# Patient Record
Sex: FEMALE | Race: BLACK OR AFRICAN AMERICAN | Employment: OTHER | URBAN - METROPOLITAN AREA
[De-identification: names, ages, dates, MRNs, and addresses within clinical notes are randomized per-mention and may not be internally consistent; named-entity substitution may affect disease eponyms.]

---

## 2018-03-12 ENCOUNTER — HOSPITAL ENCOUNTER (EMERGENCY)
Age: 73
Discharge: HOME | End: 2018-03-12
Attending: EMERGENCY MEDICINE | Admitting: INTERNAL MEDICINE
Payer: MEDICARE

## 2018-03-12 ENCOUNTER — APPOINTMENT (OUTPATIENT)
Dept: GENERAL RADIOLOGY | Age: 73
End: 2018-03-12
Attending: EMERGENCY MEDICINE
Payer: MEDICARE

## 2018-03-12 VITALS
DIASTOLIC BLOOD PRESSURE: 54 MMHG | RESPIRATION RATE: 24 BRPM | HEIGHT: 65 IN | TEMPERATURE: 97.8 F | OXYGEN SATURATION: 100 % | WEIGHT: 157 LBS | BODY MASS INDEX: 26.16 KG/M2 | SYSTOLIC BLOOD PRESSURE: 133 MMHG | HEART RATE: 84 BPM

## 2018-03-12 DIAGNOSIS — R07.89 OTHER CHEST PAIN: Primary | ICD-10-CM

## 2018-03-12 LAB
ALBUMIN SERPL-MCNC: 3.3 G/DL (ref 3.4–5)
ALBUMIN/GLOB SERPL: 0.9 {RATIO} (ref 0.8–1.7)
ALP SERPL-CCNC: 65 U/L (ref 45–117)
ALT SERPL-CCNC: 34 U/L (ref 13–56)
ANION GAP SERPL CALC-SCNC: 6 MMOL/L (ref 3–18)
AST SERPL-CCNC: 19 U/L (ref 15–37)
BASOPHILS # BLD: 0 K/UL (ref 0–0.06)
BASOPHILS NFR BLD: 1 % (ref 0–2)
BILIRUB SERPL-MCNC: 0.5 MG/DL (ref 0.2–1)
BNP SERPL-MCNC: 33 PG/ML (ref 0–900)
BUN SERPL-MCNC: 14 MG/DL (ref 7–18)
BUN/CREAT SERPL: 15 (ref 12–20)
CALCIUM SERPL-MCNC: 9.6 MG/DL (ref 8.5–10.1)
CHLORIDE SERPL-SCNC: 107 MMOL/L (ref 100–108)
CK MB CFR SERPL CALC: 1.4 % (ref 0–4)
CK MB SERPL-MCNC: 1.3 NG/ML (ref 5–25)
CK SERPL-CCNC: 92 U/L (ref 26–192)
CO2 SERPL-SCNC: 33 MMOL/L (ref 21–32)
CREAT SERPL-MCNC: 0.93 MG/DL (ref 0.6–1.3)
DIFFERENTIAL METHOD BLD: NORMAL
EOSINOPHIL # BLD: 0.1 K/UL (ref 0–0.4)
EOSINOPHIL NFR BLD: 2 % (ref 0–5)
ERYTHROCYTE [DISTWIDTH] IN BLOOD BY AUTOMATED COUNT: 13.2 % (ref 11.6–14.5)
GLOBULIN SER CALC-MCNC: 3.7 G/DL (ref 2–4)
GLUCOSE SERPL-MCNC: 99 MG/DL (ref 74–99)
HCT VFR BLD AUTO: 42.9 % (ref 35–45)
HGB BLD-MCNC: 14.2 G/DL (ref 12–16)
LYMPHOCYTES # BLD: 2 K/UL (ref 0.9–3.6)
LYMPHOCYTES NFR BLD: 35 % (ref 21–52)
MAGNESIUM SERPL-MCNC: 1.9 MG/DL (ref 1.6–2.6)
MCH RBC QN AUTO: 28.5 PG (ref 24–34)
MCHC RBC AUTO-ENTMCNC: 33.1 G/DL (ref 31–37)
MCV RBC AUTO: 86.1 FL (ref 74–97)
MONOCYTES # BLD: 0.3 K/UL (ref 0.05–1.2)
MONOCYTES NFR BLD: 5 % (ref 3–10)
NEUTS SEG # BLD: 3.3 K/UL (ref 1.8–8)
NEUTS SEG NFR BLD: 57 % (ref 40–73)
PLATELET # BLD AUTO: 195 K/UL (ref 135–420)
PMV BLD AUTO: 10.7 FL (ref 9.2–11.8)
POTASSIUM SERPL-SCNC: 4.2 MMOL/L (ref 3.5–5.5)
PROT SERPL-MCNC: 7 G/DL (ref 6.4–8.2)
RBC # BLD AUTO: 4.98 M/UL (ref 4.2–5.3)
SODIUM SERPL-SCNC: 146 MMOL/L (ref 136–145)
TROPONIN I SERPL-MCNC: <0.02 NG/ML (ref 0–0.06)
TROPONIN I SERPL-MCNC: <0.02 NG/ML (ref 0–0.06)
WBC # BLD AUTO: 5.8 K/UL (ref 4.6–13.2)

## 2018-03-12 PROCEDURE — 83735 ASSAY OF MAGNESIUM: CPT | Performed by: EMERGENCY MEDICINE

## 2018-03-12 PROCEDURE — 99285 EMERGENCY DEPT VISIT HI MDM: CPT

## 2018-03-12 PROCEDURE — 85025 COMPLETE CBC W/AUTO DIFF WBC: CPT | Performed by: EMERGENCY MEDICINE

## 2018-03-12 PROCEDURE — 71046 X-RAY EXAM CHEST 2 VIEWS: CPT

## 2018-03-12 PROCEDURE — 84484 ASSAY OF TROPONIN QUANT: CPT | Performed by: EMERGENCY MEDICINE

## 2018-03-12 PROCEDURE — 83880 ASSAY OF NATRIURETIC PEPTIDE: CPT | Performed by: EMERGENCY MEDICINE

## 2018-03-12 PROCEDURE — 80053 COMPREHEN METABOLIC PANEL: CPT | Performed by: EMERGENCY MEDICINE

## 2018-03-12 PROCEDURE — 93005 ELECTROCARDIOGRAM TRACING: CPT

## 2018-03-12 NOTE — DISCHARGE INSTRUCTIONS
Chest Pain: Care Instructions  Your Care Instructions    There are many things that can cause chest pain. Some are not serious and will get better on their own in a few days. But some kinds of chest pain need more testing and treatment. Your doctor may have recommended a follow-up visit in the next 8 to 12 hours. If you are not getting better, you may need more tests or treatment. Even though your doctor has released you, you still need to watch for any problems. The doctor carefully checked you, but sometimes problems can develop later. If you have new symptoms or if your symptoms do not get better, get medical care right away. If you have worse or different chest pain or pressure that lasts more than 5 minutes or you passed out (lost consciousness), call 911 or seek other emergency help right away. A medical visit is only one step in your treatment. Even if you feel better, you still need to do what your doctor recommends, such as going to all suggested follow-up appointments and taking medicines exactly as directed. This will help you recover and help prevent future problems. How can you care for yourself at home? · Rest until you feel better. · Take your medicine exactly as prescribed. Call your doctor if you think you are having a problem with your medicine. · Do not drive after taking a prescription pain medicine. When should you call for help? Call 911 if:  ? · You passed out (lost consciousness). ? · You have severe difficulty breathing. ? · You have symptoms of a heart attack. These may include:  ¨ Chest pain or pressure, or a strange feeling in your chest.  ¨ Sweating. ¨ Shortness of breath. ¨ Nausea or vomiting. ¨ Pain, pressure, or a strange feeling in your back, neck, jaw, or upper belly or in one or both shoulders or arms. ¨ Lightheadedness or sudden weakness. ¨ A fast or irregular heartbeat.   After you call 911, the  may tell you to chew 1 adult-strength or 2 to 4 low-dose aspirin. Wait for an ambulance. Do not try to drive yourself. ?Call your doctor today if:  ? · You have any trouble breathing. ? · Your chest pain gets worse. ? · You are dizzy or lightheaded, or you feel like you may faint. ? · You are not getting better as expected. ? · You are having new or different chest pain. Where can you learn more? Go to http://leanne-larry.info/. Enter A120 in the search box to learn more about \"Chest Pain: Care Instructions. \"  Current as of: March 20, 2017  Content Version: 11.4  © 0568-8114 [employer]. Care instructions adapted under license by [employer] (which disclaims liability or warranty for this information). If you have questions about a medical condition or this instruction, always ask your healthcare professional. [address] any warranty or liability for your use of this information.

## 2018-03-12 NOTE — ED PROVIDER NOTES
EMERGENCY DEPARTMENT HISTORY AND PHYSICAL EXAM    Date: 3/12/2018  Patient Name: Fredrick Manuel    History of Presenting Illness     Chief Complaint   Patient presents with    Chest Pain     History Provided By: Patient    Chief Complaint: Chest pain   Duration: ~3 Days  Timing:  Intermittent  Location: Left chest   Severity: 5 out of 10  Modifying Factors: No relieving or worsening factors   Other Symptoms: jaw/ear pain    Additional History (Context):   11:20 AM   Fredrick Manuel is a 67 y.o. female with PMHX of HTN, SOB, CP, CAD, MI, and cardiac stent placement x3 who presents to the emergency department C/O intermittent 5/10 left chest pain, onset ~3 days ago. Pain usually lasts for a few seconds. Also C/O left jaw/ear pain secondary to chest pain but not simultaneously. There is no known trigger for her sxs. Pt attempted get an appointment with Nicole Pascual MD, cardiologist, who instructed the patient to come to the ED for further evaluation. Pt takes Plavix. Denies fhx of CAD. Pt denies hx of similar sxs, injuries, repetative movements of the left upper extremity, heavy lifting, fever, dizziness, diaphoresis, SOB, cough and any other sxs or complaints. PCP: Alex Mathews MD    Current Outpatient Prescriptions   Medication Sig Dispense Refill    atorvastatin (LIPITOR) 40 mg tablet take 1 tablet by mouth once daily 90 tablet 3    amlodipine (NORVASC) 5 mg tablet take 1 tablet by mouth once daily 90 tablet 3    losartan-hydrochlorothiazide (HYZAAR) 100-12.5 mg per tablet take 1 tablet by mouth once daily 30 Tab 11    Potassium Gluconate 595 (99) mg tablet Take 595 mg by mouth daily.  aspirin 81 mg tablet Take 81 mg by mouth daily.  calcium carbonate (OYSTER SHELL CALCIUM) 500 mg (1,250 mg) tablet Take 1,250 mg by mouth daily.  cholecalciferol, vitamin d3, (VITAMIN D) 1,000 unit tablet Take 1,000 Units by mouth daily.          Past History     Past Medical History:  Past Medical History: Diagnosis Date    Abnormal exercise tolerance test 2/2/2011    CAD (coronary artery disease), native coronary artery 3/3/2011    Chest pain, precordial 2/2/2011    Essential hypertension     Essential hypertension, benign 6/28/2013    Myocardial infarct     Other and unspecified hyperlipidemia 1/27/2014    SOB (shortness of breath) on exertion 2/2/2011       Past Surgical History:  Past Surgical History:   Procedure Laterality Date    HX CORONARY STENT PLACEMENT  2011    4 stents    HX PTCA         Family History:  Family History   Problem Relation Age of Onset    Coronary Artery Disease Brother        Social History:  Social History   Substance Use Topics    Smoking status: Never Smoker    Smokeless tobacco: Never Used    Alcohol use No       Allergies:  No Known Allergies      Review of Systems   Review of Systems   Constitutional: Negative for diaphoresis and fever. HENT: Positive for congestion and ear pain (ear/jaw left). Respiratory: Negative for cough and shortness of breath. Cardiovascular: Positive for chest pain (left). Neurological: Negative for dizziness and light-headedness. All other systems reviewed and are negative. Physical Exam     Vitals:    03/12/18 1330 03/12/18 1400 03/12/18 1430 03/12/18 1501   BP: 118/63 118/73 123/66 133/54   Pulse: 80 78 88 84   Resp: 18 13 26 24   Temp:       SpO2:       Weight:       Height:         Physical Exam   Nursing note and vitals reviewed. Constitutional: Alert. Well appearing, no acute distress  Head: Normocephalic, Atraumatic  Eyes: Pupils are equal, round, and reactive to light, EOMI  ENT: Moist mucous membranes, oropharynx clear. Neck: Supple, non-tender  Cardiovascular: Regular rate and rhythm, no murmurs, rubs, or gallops  Chest: Normal work of breathing and chest excursion bilaterally. One area of focal minimally reproducible tenderness over left upper anterior chest wall in the pectoralis muscle.  This point tenderness is exacerbated with simultaneous abduction of left shoulder. Lungs: Clear to ausculation bilaterally. Abdomen: Soft, non tender, non distended, normoactive bowel sounds  Back: No evidence of trauma or deformity. No CVA Tenderness. Extremities: No evidence of trauma or deformity, no LE edema  Skin: Warm and dry  Neuro: Alert and appropriate, facial movement symmetric, normal speech, strength and sensation full and symmetric bilaterally, normal gait, normal coordination  Psychiatric: Normal mood and affect      Diagnostic Study Results     Labs -  Recent Results (from the past 24 hour(s))   EKG, 12 LEAD, INITIAL    Collection Time: 03/12/18 11:06 AM   Result Value Ref Range    Ventricular Rate 85 BPM    Atrial Rate 85 BPM    P-R Interval 158 ms    QRS Duration 88 ms    Q-T Interval 386 ms    QTC Calculation (Bezet) 459 ms    Calculated P Axis 49 degrees    Calculated R Axis -35 degrees    Calculated T Axis 26 degrees    Diagnosis       Normal sinus rhythm  Left axis deviation  Voltage criteria for left ventricular hypertrophy  Cannot rule out Septal infarct (cited on or before 12-MAR-2018)  Abnormal ECG  When compared with ECG of 07-JUN-2013 18:49,  No significant change was found     CBC WITH AUTOMATED DIFF    Collection Time: 03/12/18 11:19 AM   Result Value Ref Range    WBC 5.8 4.6 - 13.2 K/uL    RBC 4.98 4.20 - 5.30 M/uL    HGB 14.2 12.0 - 16.0 g/dL    HCT 42.9 35.0 - 45.0 %    MCV 86.1 74.0 - 97.0 FL    MCH 28.5 24.0 - 34.0 PG    MCHC 33.1 31.0 - 37.0 g/dL    RDW 13.2 11.6 - 14.5 %    PLATELET 771 326 - 145 K/uL    MPV 10.7 9.2 - 11.8 FL    NEUTROPHILS 57 40 - 73 %    LYMPHOCYTES 35 21 - 52 %    MONOCYTES 5 3 - 10 %    EOSINOPHILS 2 0 - 5 %    BASOPHILS 1 0 - 2 %    ABS. NEUTROPHILS 3.3 1.8 - 8.0 K/UL    ABS. LYMPHOCYTES 2.0 0.9 - 3.6 K/UL    ABS. MONOCYTES 0.3 0.05 - 1.2 K/UL    ABS. EOSINOPHILS 0.1 0.0 - 0.4 K/UL    ABS.  BASOPHILS 0.0 0.0 - 0.06 K/UL    DF AUTOMATED     METABOLIC PANEL, COMPREHENSIVE Collection Time: 03/12/18 11:19 AM   Result Value Ref Range    Sodium 146 (H) 136 - 145 mmol/L    Potassium 4.2 3.5 - 5.5 mmol/L    Chloride 107 100 - 108 mmol/L    CO2 33 (H) 21 - 32 mmol/L    Anion gap 6 3.0 - 18 mmol/L    Glucose 99 74 - 99 mg/dL    BUN 14 7.0 - 18 MG/DL    Creatinine 0.93 0.6 - 1.3 MG/DL    BUN/Creatinine ratio 15 12 - 20      GFR est AA >60 >60 ml/min/1.73m2    GFR est non-AA 59 (L) >60 ml/min/1.73m2    Calcium 9.6 8.5 - 10.1 MG/DL    Bilirubin, total 0.5 0.2 - 1.0 MG/DL    ALT (SGPT) 34 13 - 56 U/L    AST (SGOT) 19 15 - 37 U/L    Alk. phosphatase 65 45 - 117 U/L    Protein, total 7.0 6.4 - 8.2 g/dL    Albumin 3.3 (L) 3.4 - 5.0 g/dL    Globulin 3.7 2.0 - 4.0 g/dL    A-G Ratio 0.9 0.8 - 1.7     CARDIAC PANEL,(CK, CKMB & TROPONIN)    Collection Time: 03/12/18 11:19 AM   Result Value Ref Range    CK 92 26 - 192 U/L    CK - MB 1.3 <3.6 ng/ml    CK-MB Index 1.4 0.0 - 4.0 %    Troponin-I, Qt. <0.02 0.00 - 0.06 NG/ML   MAGNESIUM    Collection Time: 03/12/18 11:19 AM   Result Value Ref Range    Magnesium 1.9 1.6 - 2.6 mg/dL   NT-PRO BNP    Collection Time: 03/12/18 11:19 AM   Result Value Ref Range    NT pro-BNP 33 0 - 900 PG/ML   TROPONIN I    Collection Time: 03/12/18  4:14 PM   Result Value Ref Range    Troponin-I, Qt. <0.02 0.00 - 0.06 NG/ML            Radiologic Studies -   CT Results  (Last 48 hours)    None        CXR Results  (Last 48 hours)               03/12/18 1145  XR CHEST PA LAT Final result    Impression:  IMPRESSION:       No acute cardiopulmonary disease. Narrative:  History: Left-sided chest pain for 3-4 days. Technique: CHEST PA AND LATERAL VIEWS       Comparison study: 06/07/13       Findings:       Streaky linear opacity in the right base suggests atelectasis or interval   scarring. Vascular calcification. There is no evidence of consolidation,   congestive heart failure, pleural effusions or pneumothoraces. No gross bony   abnormalities are seen. Medications given in the ED-  Medications - No data to display      Medical Decision Making   I am the first provider for this patient. I reviewed the vital signs, available nursing notes, past medical history, past surgical history, family history and social history. Vital Signs-Reviewed the patient's vital signs. Pulse Oximetry Analysis - 100% on RA     Cardiac Monitor:  Rate: 85 bpm  Rhythm: NSR    EKG interpretation: (Preliminary)  11:06 AM   NSR at 85 bpm. No ST segment change or T wave inversion. Normal intervals. EKG read by Tommy Smalls MD    Records Reviewed: Nursing Notes    Procedures:  Procedures    ED Course:   11:20 AM Initial assessment performed. The patients presenting problems have been discussed, and they are in agreement with the care plan formulated and outlined with them. I have encouraged them to ask questions as they arise throughout their visit. 1:32 PM Discussed patient's history, exam, and available diagnostics results with Maribell Avelar MD, cardiology, who recommends 4 hour repeat Troponin and follow up with Alex Cassidy MD.     SIGN OUT:  3:00 PM  Patient's presentation, labs/imaging and plan of care was reviewed with Leonetta Mcardle, MD as part of sign out. They will check her second Troponin at 4:30 PM and will discharge and follow up with Dr. Jackie Szymanski as part of the plan discussed with the patient. Leonetta Mcardle, MD's assistance in completion of this plan is greatly appreciated but it should be noted that I will be the provider of record for this patient. riky Nickerson for Tommy Smalls MD.       Diagnosis and Disposition       DISCHARGE NOTE:  5:44 PM  Merlin Kettle Brooks's  results have been reviewed with her. She has been counseled regarding her diagnosis, treatment, and plan. She verbally conveys understanding and agreement of the signs, symptoms, diagnosis, treatment and prognosis and additionally agrees to follow up as discussed.   She also agrees with the care-plan and conveys that all of her questions have been answered. I have also provided discharge instructions for her that include: educational information regarding their diagnosis and treatment, and list of reasons why they would want to return to the ED prior to their follow-up appointment, should her condition change. She has been provided with education for proper emergency department utilization. Discussion for Otto Woods MD: 67 y.o. female presents for evaluation of chest pain that by description sounds very muscoskeletal by etiology and is not concerning for cardiac cause. However she has a significant prior cardiac history that required stents and will acquire a second Troponin to evaluate for any change. Initial labs are reassuring for NAP and EKG is non-ischemic. She will follow up with Dr. Jillian Trujillo as long as second Troponin is normal.     CLINICAL IMPRESSION:    1. Other chest pain        PLAN:  1. D/C Home  2. Current Discharge Medication List        3. Follow-up Information     Follow up With Details Comments [address], [place] Schedule an appointment as soon as possible for a visit in 2 days For follow up with cardiology  [address] [facility] [address]  [address] [facility]      Shena Reyes MD Schedule an appointment as soon as possible for a visit in 2 days  [address]      [facility] EMERGENCY DEPT  As needed, if symptoms worsen [address] [place] [id number]  [phone]        _______________________________    Attestations: This note is prepared by Laura Guadarrama and Dorene Schmidt, acting as Scribe for Otto Woods MD.    Otto Woods MD:  The scribe's documentation has been prepared under my direction and personally reviewed by me in its entirety. I confirm that the note above accurately reflects all work, treatment, procedures, and medical decision making performed by me.     This note is prepared by Gladys Newman, acting as Scribe for Jammie Knutson MD.    Jammie Knutson MD: The scribe's documentation has been prepared under my direction and personally reviewed by me in its entirety.  I confirm that the note above accurately reflects all work, treatment, procedures, and medical decision making performed by me.   _______________________________

## 2018-03-18 LAB
ATRIAL RATE: 85 BPM
CALCULATED P AXIS, ECG09: 49 DEGREES
CALCULATED R AXIS, ECG10: -35 DEGREES
CALCULATED T AXIS, ECG11: 26 DEGREES
DIAGNOSIS, 93000: NORMAL
P-R INTERVAL, ECG05: 158 MS
Q-T INTERVAL, ECG07: 386 MS
QRS DURATION, ECG06: 88 MS
QTC CALCULATION (BEZET), ECG08: 459 MS
VENTRICULAR RATE, ECG03: 85 BPM

## 2018-09-05 ENCOUNTER — HOSPITAL ENCOUNTER (OUTPATIENT)
Dept: LAB | Age: 73
Discharge: HOME | End: 2018-09-05
Payer: MEDICARE

## 2018-09-05 LAB
ALBUMIN SERPL-MCNC: 3.4 G/DL (ref 3.4–5)
ALBUMIN/GLOB SERPL: 1 {RATIO} (ref 0.8–1.7)
ALP SERPL-CCNC: 73 U/L (ref 45–117)
ALT SERPL-CCNC: 32 U/L (ref 13–56)
ANION GAP SERPL CALC-SCNC: 11 MMOL/L (ref 3–18)
AST SERPL-CCNC: 17 U/L (ref 15–37)
BASOPHILS # BLD: 0 K/UL (ref 0–0.1)
BASOPHILS NFR BLD: 1 % (ref 0–2)
BILIRUB SERPL-MCNC: 0.4 MG/DL (ref 0.2–1)
BUN SERPL-MCNC: 17 MG/DL (ref 7–18)
BUN/CREAT SERPL: 17 (ref 12–20)
CALCIUM SERPL-MCNC: 9.4 MG/DL (ref 8.5–10.1)
CHLORIDE SERPL-SCNC: 107 MMOL/L (ref 100–108)
CO2 SERPL-SCNC: 26 MMOL/L (ref 21–32)
CREAT SERPL-MCNC: 1.02 MG/DL (ref 0.6–1.3)
DIFFERENTIAL METHOD BLD: NORMAL
EOSINOPHIL # BLD: 0.1 K/UL (ref 0–0.4)
EOSINOPHIL NFR BLD: 2 % (ref 0–5)
ERYTHROCYTE [DISTWIDTH] IN BLOOD BY AUTOMATED COUNT: 12.9 % (ref 11.6–14.5)
GLOBULIN SER CALC-MCNC: 3.5 G/DL (ref 2–4)
GLUCOSE SERPL-MCNC: 89 MG/DL (ref 74–99)
HCT VFR BLD AUTO: 41.6 % (ref 35–45)
HGB BLD-MCNC: 13.6 G/DL (ref 12–16)
INR PPP: 0.9 (ref 0.8–1.2)
LYMPHOCYTES # BLD: 1.9 K/UL (ref 0.9–3.6)
LYMPHOCYTES NFR BLD: 40 % (ref 21–52)
MCH RBC QN AUTO: 27.8 PG (ref 24–34)
MCHC RBC AUTO-ENTMCNC: 32.7 G/DL (ref 31–37)
MCV RBC AUTO: 85.1 FL (ref 74–97)
MONOCYTES # BLD: 0.2 K/UL (ref 0.05–1.2)
MONOCYTES NFR BLD: 5 % (ref 3–10)
NEUTS SEG # BLD: 2.6 K/UL (ref 1.8–8)
NEUTS SEG NFR BLD: 52 % (ref 40–73)
PLATELET # BLD AUTO: 214 K/UL (ref 135–420)
PMV BLD AUTO: 10.8 FL (ref 9.2–11.8)
POTASSIUM SERPL-SCNC: 3.6 MMOL/L (ref 3.5–5.5)
PROT SERPL-MCNC: 6.9 G/DL (ref 6.4–8.2)
PROTHROMBIN TIME: 12.3 SEC (ref 11.5–15.2)
RBC # BLD AUTO: 4.89 M/UL (ref 4.2–5.3)
SODIUM SERPL-SCNC: 144 MMOL/L (ref 136–145)
WBC # BLD AUTO: 4.8 K/UL (ref 4.6–13.2)

## 2018-09-05 PROCEDURE — 85025 COMPLETE CBC W/AUTO DIFF WBC: CPT | Performed by: INTERNAL MEDICINE

## 2018-09-05 PROCEDURE — 85610 PROTHROMBIN TIME: CPT | Performed by: INTERNAL MEDICINE

## 2018-09-05 PROCEDURE — 80053 COMPREHEN METABOLIC PANEL: CPT | Performed by: INTERNAL MEDICINE

## 2018-09-05 PROCEDURE — 36415 COLL VENOUS BLD VENIPUNCTURE: CPT | Performed by: INTERNAL MEDICINE

## 2018-09-18 ENCOUNTER — HOSPITAL ENCOUNTER (OUTPATIENT)
Age: 73
Setting detail: OUTPATIENT SURGERY
Discharge: HOME | End: 2018-09-18
Attending: INTERNAL MEDICINE | Admitting: INTERNAL MEDICINE
Payer: MEDICARE

## 2018-09-18 VITALS
HEIGHT: 66 IN | RESPIRATION RATE: 20 BRPM | BODY MASS INDEX: 27.48 KG/M2 | SYSTOLIC BLOOD PRESSURE: 122 MMHG | WEIGHT: 171 LBS | TEMPERATURE: 98 F | OXYGEN SATURATION: 96 % | HEART RATE: 78 BPM | DIASTOLIC BLOOD PRESSURE: 67 MMHG

## 2018-09-18 DIAGNOSIS — I25.10 CORONARY ARTERY DISEASE INVOLVING NATIVE HEART WITHOUT ANGINA PECTORIS, UNSPECIFIED VESSEL OR LESION TYPE: ICD-10-CM

## 2018-09-18 DIAGNOSIS — R94.39 ABNORMAL STRESS TEST: ICD-10-CM

## 2018-09-18 PROCEDURE — 74011250636 HC RX REV CODE- 250/636

## 2018-09-18 PROCEDURE — 99152 MOD SED SAME PHYS/QHP 5/>YRS: CPT | Performed by: INTERNAL MEDICINE

## 2018-09-18 PROCEDURE — 74011000250 HC RX REV CODE- 250: Performed by: INTERNAL MEDICINE

## 2018-09-18 PROCEDURE — C1769 GUIDE WIRE: HCPCS | Performed by: INTERNAL MEDICINE

## 2018-09-18 PROCEDURE — 74011636320 HC RX REV CODE- 636/320: Performed by: INTERNAL MEDICINE

## 2018-09-18 PROCEDURE — 77030029997 HC DEV COM RDL R BND TELE -B: Performed by: INTERNAL MEDICINE

## 2018-09-18 PROCEDURE — 77030004522 HC CATH ANGI DX EXPO BSC -A: Performed by: INTERNAL MEDICINE

## 2018-09-18 PROCEDURE — C1894 INTRO/SHEATH, NON-LASER: HCPCS | Performed by: INTERNAL MEDICINE

## 2018-09-18 PROCEDURE — 77030008543 HC TBNG MON PRSS MRTM -A: Performed by: INTERNAL MEDICINE

## 2018-09-18 PROCEDURE — 93458 L HRT ARTERY/VENTRICLE ANGIO: CPT | Performed by: INTERNAL MEDICINE

## 2018-09-18 PROCEDURE — 77030013797 HC KT TRNSDUC PRSSR EDWD -A: Performed by: INTERNAL MEDICINE

## 2018-09-18 PROCEDURE — 99153 MOD SED SAME PHYS/QHP EA: CPT | Performed by: INTERNAL MEDICINE

## 2018-09-18 PROCEDURE — 74011250636 HC RX REV CODE- 250/636: Performed by: INTERNAL MEDICINE

## 2018-09-18 RX ORDER — SODIUM CHLORIDE 9 MG/ML
100 INJECTION, SOLUTION INTRAVENOUS CONTINUOUS
Status: DISCONTINUED | OUTPATIENT
Start: 2018-09-18 | End: 2018-09-18 | Stop reason: HOSPADM

## 2018-09-18 RX ORDER — SODIUM CHLORIDE 9 MG/ML
75 INJECTION, SOLUTION INTRAVENOUS CONTINUOUS
Status: DISCONTINUED | OUTPATIENT
Start: 2018-09-18 | End: 2018-09-18 | Stop reason: HOSPADM

## 2018-09-18 RX ORDER — VERAPAMIL HYDROCHLORIDE 2.5 MG/ML
INJECTION, SOLUTION INTRAVENOUS AS NEEDED
Status: DISCONTINUED | OUTPATIENT
Start: 2018-09-18 | End: 2018-09-18 | Stop reason: HOSPADM

## 2018-09-18 RX ORDER — HEPARIN SODIUM 1000 [USP'U]/ML
INJECTION, SOLUTION INTRAVENOUS; SUBCUTANEOUS AS NEEDED
Status: DISCONTINUED | OUTPATIENT
Start: 2018-09-18 | End: 2018-09-18 | Stop reason: HOSPADM

## 2018-09-18 RX ORDER — LIDOCAINE HYDROCHLORIDE 10 MG/ML
INJECTION INFILTRATION; PERINEURAL AS NEEDED
Status: DISCONTINUED | OUTPATIENT
Start: 2018-09-18 | End: 2018-09-18 | Stop reason: HOSPADM

## 2018-09-18 RX ORDER — FENTANYL CITRATE 50 UG/ML
INJECTION, SOLUTION INTRAMUSCULAR; INTRAVENOUS AS NEEDED
Status: DISCONTINUED | OUTPATIENT
Start: 2018-09-18 | End: 2018-09-18 | Stop reason: HOSPADM

## 2018-09-18 RX ORDER — SODIUM CHLORIDE 0.9 % (FLUSH) 0.9 %
5-10 SYRINGE (ML) INJECTION EVERY 8 HOURS
Status: DISCONTINUED | OUTPATIENT
Start: 2018-09-18 | End: 2018-09-18 | Stop reason: HOSPADM

## 2018-09-18 RX ORDER — SODIUM CHLORIDE 0.9 % (FLUSH) 0.9 %
5-10 SYRINGE (ML) INJECTION AS NEEDED
Status: DISCONTINUED | OUTPATIENT
Start: 2018-09-18 | End: 2018-09-18 | Stop reason: HOSPADM

## 2018-09-18 RX ORDER — MIDAZOLAM HYDROCHLORIDE 1 MG/ML
INJECTION, SOLUTION INTRAMUSCULAR; INTRAVENOUS AS NEEDED
Status: DISCONTINUED | OUTPATIENT
Start: 2018-09-18 | End: 2018-09-18 | Stop reason: HOSPADM

## 2018-09-18 RX ADMIN — SODIUM CHLORIDE 75 ML/HR: 900 INJECTION, SOLUTION INTRAVENOUS at 08:03

## 2018-09-18 NOTE — Clinical Note
TRANSFER - IN REPORT:  
 
Verbal report received from: [address] . Report consisted of patient's Situation, Background, Assessment and  
Recommendations(SBAR). Opportunity for questions and clarification was provided. Assessment completed upon patient's arrival to unit and care assumed. Patient transported with a Cardiac Cath Tech / Patient Care Tech.

## 2018-09-18 NOTE — Clinical Note
TRANSFER - OUT REPORT:  
 
Verbal report given to: Jami Paul. Report consisted of patient's Situation, Background, Assessment and  
Recommendations(SBAR). Opportunity for questions and clarification was provided. Patient transported with a Cardiac Cath Tech / Patient Care Tech. Patient transported to: CARE.

## 2018-09-18 NOTE — DISCHARGE INSTRUCTIONS
Cardiac Catheterization/Angiography Discharge Instructions    *Check the puncture site frequently for swelling or bleeding. If you see any bleeding, lie down and apply pressure over the area with a clean town or washcloth. Notify your doctor for any redness, swelling, drainage or oozing from the puncture site. Notify your doctor for any fever or chills. *If the leg or arm with the puncture becomes cold, numb or painful, call Dr Meagan Mancia     *Activity should be limited for the next 48 hours. Climb stairs as little as possible and avoid any stooping, bending or strenuous activity for 48 hours. No heavy lifting (anything over 10 pounds) for three days. *Do not drive for 48 hours. *You may resume your usual diet. Drink more fluids than usual.    *Have a responsible person drive you home and stay with you for at least 24 hours after your heart catheterization/angiography. *You may remove the bandage from your Left and Arm in 24 hours. You may shower in 24 hours. No tub baths, hot tubs or swimming for one week. Do not place any lotions, creams, powders, ointments over the puncture site for one week. You may place a clean band-aid over the puncture site each day for 5 days. Change this daily. DISCHARGE SUMMARY from Nurse    PATIENT INSTRUCTIONS:    After general anesthesia or intravenous sedation, for 24 hours or while taking prescription Narcotics:  · Limit your activities  · Do not drive and operate hazardous machinery  · Do not make important personal or business decisions  · Do  not drink alcoholic beverages  · If you have not urinated within 8 hours after discharge, please contact your surgeon on call.     Report the following to your surgeon:  · Excessive pain, swelling, redness or odor of or around the surgical area  · Temperature over 100.5  · Nausea and vomiting lasting longer than 4 hours or if unable to take medications  · Any signs of decreased circulation or nerve impairment to extremity: change in color, persistent  numbness, tingling, coldness or increase pain  · Any questions    What to do at Home:  Recommended activity: Activity as tolerated,       *  Please give a list of your current medications to your Primary Care Provider. *  Please update this list whenever your medications are discontinued, doses are      changed, or new medications (including over-the-counter products) are added. *  Please carry medication information at all times in case of emergency situations. These are general instructions for a healthy lifestyle:    No smoking/ No tobacco products/ Avoid exposure to second hand smoke  Surgeon General's Warning:  Quitting smoking now greatly reduces serious risk to your health. Obesity, smoking, and sedentary lifestyle greatly increases your risk for illness    A healthy diet, regular physical exercise & weight monitoring are important for maintaining a healthy lifestyle    You may be retaining fluid if you have a history of heart failure or if you experience any of the following symptoms:  Weight gain of 3 pounds or more overnight or 5 pounds in a week, increased swelling in our hands or feet or shortness of breath while lying flat in bed. Please call your doctor as soon as you notice any of these symptoms; do not wait until your next office visit. Recognize signs and symptoms of STROKE:    F-face looks uneven    A-arms unable to move or move unevenly    S-speech slurred or non-existent    T-time-call 911 as soon as signs and symptoms begin-DO NOT go       Back to bed or wait to see if you get better-TIME IS BRAIN. Warning Signs of HEART ATTACK     Call 911 if you have these symptoms:   Chest discomfort. Most heart attacks involve discomfort in the center of the chest that lasts more than a few minutes, or that goes away and comes back. It can feel like uncomfortable pressure, squeezing, fullness, or pain.    Discomfort in other areas of the upper body. Symptoms can include pain or discomfort in one or both arms, the back, neck, jaw, or stomach.  Shortness of breath with or without chest discomfort.  Other signs may include breaking out in a cold sweat, nausea, or lightheadedness. Don't wait more than five minutes to call 911 - MINUTES MATTER! Fast action can save your life. Calling 911 is almost always the fastest way to get lifesaving treatment. Emergency Medical Services staff can begin treatment when they arrive -- up to an hour sooner than if someone gets to the hospital by car. The discharge information has been reviewed with the patient and son. The patient and caregiver verbalized understanding. Discharge medications reviewed with the patient and caregiver and appropriate educational materials and side effects teaching were provided.       Patient armband removed and shredded    ___________________________________________________________________________________________________________________________________

## 2018-09-18 NOTE — PROGRESS NOTES
1000 Pt back from to the care unit S/P cardiac cath. Pt awake and alert. Lt wrist accessed, TR band in place and immobilizer in place. Site wnl. Offered no c/o pain. 1145 TR band removed and tolerated well. site wnl 
 
1300 Pt discharged home in the care of son, recovery uneventful. All discharge instructions reviewed and understandings verbalized well. Pt escorted to car via w/c and left with son in stable condition.

## 2018-09-18 NOTE — PROCEDURES
LHC and Coronary angiogram done via left radial approach. Coronary anatomy described below with noted coronary artery disease. Left main has luminal irregularities. LAD has luminal irregularities. Mid and distal LAD stent is patent. LCx is non dominant vessel. Mid LCx has 50% stenosis. OM1 is 100% occluded in proximal portion. OM1 seen by left to left and right to left collaterals. RCA is dominant vessel. Proximal RCA has 50% stenosis, Mid to distal RCA has 50-60% stenosis. Right to left collaterals seen. LV gram was not done. LVEDP 6 mm hg. No significant gradient on pull back. Patient tolerated procedure well. Scant blood loss. No complications. No specimen removed. Recommendation:    Medication considered:   Aspirin, beta blocker, ACEI, Nitrates and statin   CAD risk factor education  Diet education  Control cholesterol  Blood pressure control  Exercise education: Age and functional status appropriate.

## 2018-09-18 NOTE — DISCHARGE SUMMARY
Discharge Summary    Patient: Randal Jhaveri MRN: [id number]  CSN: [id number]    YOB: 1945  Age: 68 y.o. Sex: female    DOA: 9/18/2018 LOS:  LOS: 0 days   Discharge Date:      Primary Care Provider:  Sathish Wood MD    Admission Diagnoses: Coronary artery disease involving native heart with angina pectoris, unspecified vessel or lesion type   Abnormal stress test [R94.39]    Discharge Diagnoses:  CAD with angina   Problem List as of 9/18/2018  Date Reviewed: 7/29/2014          Codes Class Noted - Resolved    Other and unspecified hyperlipidemia ICD-10-CM: E78.5  ICD-9-CM: 272.4  1/27/2014 - Present        Essential hypertension, benign ICD-10-CM: I10  ICD-9-CM: 401.1  6/28/2013 - Present        CAD (coronary artery disease), native coronary artery ICD-10-CM: I25.10  ICD-9-CM: 414.01  3/3/2011 - Present        SOB (shortness of breath) on exertion ICD-10-CM: R06.02  ICD-9-CM: 786.05  2/2/2011 - Present        Chest pain, precordial ICD-10-CM: R07.2  ICD-9-CM: 786.51  2/2/2011 - Present        Abnormal exercise tolerance test ICD-10-CM: R94.30  ICD-9-CM: 794.30  2/2/2011 - Present              Discharge Medications:     Current Discharge Medication List      CONTINUE these medications which have NOT CHANGED    Details   atorvastatin (LIPITOR) 40 mg tablet take 1 tablet by mouth once daily  Qty: 90 tablet, Refills: 3      amlodipine (NORVASC) 5 mg tablet take 1 tablet by mouth once daily  Qty: 90 tablet, Refills: 3      losartan-hydrochlorothiazide (HYZAAR) 100-12.5 mg per tablet take 1 tablet by mouth once daily  Qty: 30 Tab, Refills: 11      Potassium Gluconate 595 (99) mg tablet Take 595 mg by mouth daily. aspirin 81 mg tablet Take 81 mg by mouth daily. calcium carbonate (OYSTER SHELL CALCIUM) 500 mg (1,250 mg) tablet Take 1,250 mg by mouth daily. cholecalciferol, vitamin d3, (VITAMIN D) 1,000 unit tablet Take 1,000 Units by mouth daily.              Discharge Condition: Stable Procedures : LHC and coronary angiogram     Consults: None       PHYSICAL EXAM   Visit Vitals    /68 (BP 1 Location: Right arm, BP Patient Position: At rest;Supine)    Pulse 72    Temp 97.7 °F (36.5 °C)    Resp 20    Ht 5' 6\" (1.676 m)    Wt 77.6 kg (171 lb)    SpO2 96%    Breastfeeding No    BMI 27.6 kg/m2     General: Awake, cooperative, no acute distress    HEENT: NC, Atraumatic. PERRLA, EOMI. Anicteric sclerae. Lungs:  CTA Bilaterally. No Wheezing/Rhonchi/Rales. Heart:  Regular  rhythm,  No murmur, No Rubs, No Gallops  Abdomen: Soft, Non distended, Non tender. +Bowel sounds,   Extremities: No c/c/e  Psych:   Not anxious or agitated. Neurologic:  No acute neurological deficits. Admission HPI : See H/P    Hospital Course : Patient came for out patient LHC and coronary angiogram. LHC and Coronary angiogram done via left radial approach. Coronary anatomy described below with noted coronary artery disease. Left main has luminal irregularities. LAD has luminal irregularities. Mid and distal LAD stent is patent. LCx is non dominant vessel. Mid LCx has 50% stenosis. OM1 is 100% occluded in proximal portion. OM1 seen by left to left and right to left collaterals. RCA is dominant vessel. Proximal RCA has 50% stenosis, Mid to distal RCA has 50-60% stenosis. Right to left collaterals seen. LV gram was not done. LVEDP 6 mm hg. No significant gradient on pull back. Patient tolerated procedure well. Scant blood loss. No complications. No specimen removed. Recommendation:    Medication considered:   Aspirin, beta blocker, ACEI, Nitrates and statin   CAD risk factor education  Diet education  Control cholesterol  Blood pressure control  Exercise education: Age and functional status appropriate. Activity: No weight lifting not more than 10 lbs from left hand for 7 days, No driving for 24 hours     Diet: Cardiac     Follow-up:  In cardiology clinic after 2 weeks    Disposition: Home    Minutes spent on discharge: 30 minutes       Labs: Results:       Chemistry No results for input(s): GLU, NA, K, CL, CO2, BUN, CREA, CA, AGAP, BUCR, TBIL, GPT, AP, TP, ALB, GLOB, AGRAT in the last 72 hours. CBC w/Diff No results for input(s): WBC, RBC, HGB, HCT, PLT, GRANS, LYMPH, EOS, HGBEXT, HCTEXT, PLTEXT in the last 72 hours. Cardiac Enzymes No results for input(s): CPK, CKND1, MIRIAN in the last 72 hours. No lab exists for component: CKRMB, TROIP   Coagulation No results for input(s): PTP, INR, APTT in the last 72 hours. No lab exists for component: INREXT    Lipid Panel Lab Results   Component Value Date/Time    Cholesterol, total 154 07/29/2014 09:20 AM    HDL Cholesterol 37 (L) 07/29/2014 09:20 AM    LDL, calculated 64 07/29/2014 09:20 AM    VLDL, calculated 53 (H) 07/29/2014 09:20 AM    Triglyceride 264 (H) 07/29/2014 09:20 AM    CHOL/HDL Ratio 4.9 02/09/2011 04:10 AM      BNP No results for input(s): BNPP in the last 72 hours. Liver Enzymes No results for input(s): TP, ALB, TBIL, AP, SGOT, GPT in the last 72 hours. No lab exists for component: DBIL   Thyroid Studies No results found for: T4, T3U, TSH, TSHEXT         Significant Diagnostic Studies: No results found. No results found for this or any previous visit.         CC: Torrey Young MD

## 2018-09-18 NOTE — IP AVS SNAPSHOT
[facility] 
 
 
 [address] [address] 
[phone] Patient: Magdaleno Sharif MRN: [id number] :1945 About your hospitalization You were admitted on:  2018 You last received care in the:  [address] You were discharged on:  2018 Why you were hospitalized Your primary diagnosis was:  Not on File Follow-up Information Follow up With Details Comments Contact Info Paxton Polanco MD In 2 weeks Follow up as scheduled [address] [facility] [address] [facility] 
[phone] Jojo Angeles MD   [address] [place] [address] [address] [address] 
[phone] Discharge Orders None A check adi indicates which time of day the medication should be taken. My Medications CONTINUE taking these medications Instructions Each Dose to Equal  
 Morning Noon Evening Bedtime  
 amLODIPine 5 mg tablet Commonly known as:  Kuldeep Benitez Your last dose was: Your next dose is:    
   
   
 take 1 tablet by mouth once daily  
     
   
   
   
  
 aspirin 81 mg tablet Your last dose was: Your next dose is: Take 81 mg by mouth daily. 81 mg  
    
   
   
   
  
 atorvastatin 40 mg tablet Commonly known as:  LIPITOR Your last dose was: Your next dose is:    
   
   
 take 1 tablet by mouth once daily  
     
   
   
   
  
 losartan-hydroCHLOROthiazide 100-12.5 mg per tablet Commonly known as:  HYZAAR Your last dose was: Your next dose is:    
   
   
 take 1 tablet by mouth once daily OYSTER SHELL CALCIUM 500 mg calcium (1,250 mg) tablet Generic drug:  calcium carbonate Your last dose was: Your next dose is: Take 1,250 mg by mouth daily. 1250 mg  
    
   
   
   
  
 Potassium Gluconate 595 mg (99 mg) tablet Your last dose was: Your next dose is: Take 595 mg by mouth daily. 595 mg  
    
   
   
   
  
 VITAMIN D3 1,000 unit tablet Generic drug:  cholecalciferol Your last dose was: Your next dose is: Take 1,000 Units by mouth daily. 1000 Units Discharge Instructions Cardiac Catheterization/Angiography Discharge Instructions *Check the puncture site frequently for swelling or bleeding. If you see any bleeding, lie down and apply pressure over the area with a clean town or washcloth. Notify your doctor for any redness, swelling, drainage or oozing from the puncture site. Notify your doctor for any fever or chills. *If the leg or arm with the puncture becomes cold, numb or painful, call Dr Laury Cisneros *Activity should be limited for the next 48 hours. Climb stairs as little as possible and avoid any stooping, bending or strenuous activity for 48 hours. No heavy lifting (anything over 10 pounds) for three days. *Do not drive for 48 hours. *You may resume your usual diet. Drink more fluids than usual. 
 
*Have a responsible person drive you home and stay with you for at least 24 hours after your heart catheterization/angiography. *You may remove the bandage from your Left and Arm in 24 hours. You may shower in 24 hours. No tub baths, hot tubs or swimming for one week. Do not place any lotions, creams, powders, ointments over the puncture site for one week. You may place a clean band-aid over the puncture site each day for 5 days. Change this daily. DISCHARGE SUMMARY from Nurse PATIENT INSTRUCTIONS: 
 
 
F-face looks uneven A-arms unable to move or move unevenly S-speech slurred or non-existent T-time-call 911 as soon as signs and symptoms begin-DO NOT go  
 Back to bed or wait to see if you get better-TIME IS BRAIN. Warning Signs of HEART ATTACK Call 911 if you have these symptoms: 
? Chest discomfort. Most heart attacks involve discomfort in the center of the chest that lasts more than a few minutes, or that goes away and comes back. It can feel like uncomfortable pressure, squeezing, fullness, or pain. ? Discomfort in other areas of the upper body. Symptoms can include pain or discomfort in one or both arms, the back, neck, jaw, or stomach. ? Shortness of breath with or without chest discomfort. ? Other signs may include breaking out in a cold sweat, nausea, or lightheadedness. Don't wait more than five minutes to call [address]! Fast action can save your life. Calling 911 is almost always the fastest way to get lifesaving treatment. Emergency Medical Services staff can begin treatment when they arrive  up to an hour sooner than if someone gets to the hospital by car. The discharge information has been reviewed with the patient and son. The patient and caregiver verbalized understanding. Discharge medications reviewed with the patient and caregiver and appropriate educational materials and side effects teaching were provided. Patient armband removed and shredded 
 
___________________________________________________________________________________________________________________________________ tripJanehart Announcement We are excited to announce that we are making your provider's discharge notes available to you in tripJanehart. You will see these notes when they are completed and signed by the physician that discharged you from your recent hospital stay. If you have any questions or concerns about any information you see in tripJanehart, please call the Health Information Department where you were seen or reach out to your Primary Care Provider for more information about your plan of care. Introducing [place]! [employer] introduces [employer] patient portal. Now you can access parts of your medical record, email your doctor's office, and request medication refills online. 1. In your internet browser, go to [email or web address] 2. Click on the First Time User? Click Here link in the Sign In box. You will see the New Member Sign Up page. 3. Enter your [employer] Access Code exactly as it appears below. You will not need to use this code after youve completed the sign-up process. If you do not sign up before the expiration date, you must request a new code. · [employer] Access Code: [email or web address] Expires: 12/4/2018  2:40 PM 
 
4. Enter the last four digits of your Social Security Number (xxxx) and Date of Birth (mm/dd/yyyy) as indicated and click Submit. You will be taken to the next sign-up page. 5. Create a [employer] ID. This will be your [employer] login ID and cannot be changed, so think of one that is secure and easy to remember. 6. Create a [employer] password. You can change your password at any time. 7. Enter your Password Reset Question and Answer. This can be used at a later time if you forget your password. 8. Enter your e-mail address. You will receive e-mail notification when new information is available in [address] 9. Click Sign Up. You can now view and download portions of your medical record. 10. Click the Download Summary menu link to download a portable copy of your medical information. If you have questions, please visit the Frequently Asked Questions section of the [employer] website. Remember, [employer] is NOT to be used for urgent needs. For medical emergencies, dial 911. Now available from your iPhone and Android! Introducing Dorian Olivo As a [employer] patient, I wanted to make you aware of our electronic visit tool called Dorian Olivo. [employer] 24/7 allows you to connect within minutes with a medical provider 24 hours a day, seven days a week via a mobile device or tablet or logging into a secure website from your computer. You can access [employer] from anywhere in the [place]. A virtual visit might be right for you when you have a simple condition and feel like you just dont want to get out of bed, or cant get away from work for an appointment, when your regular [employer] provider is not available (evenings, weekends or holidays), or when youre out of town and need minor care. Electronic visits cost only $49 and if the [employer] 24/7 provider determines a prescription is needed to treat your condition, one can be electronically transmitted to a nearby pharmacy*. Please take a moment to enroll today if you have not already done so. The enrollment process is free and takes just a few minutes. To enroll, please download the [employer] 24/7 saroj to your tablet or phone, or visit [email or web address] to enroll on your computer. And, as an [address] [place] [address] patient with a [employer] account, the results of your visits will be scanned into your electronic medical record and your primary care provider will be able to view the scanned results. We urge you to continue to see your regular [employer] provider for your ongoing medical care. And while your primary care provider may not be the one available when you seek a [employer] virtual visit, the peace of mind you get from getting a real diagnosis real time can be priceless. For more information on [employer], view our Frequently Asked Questions (FAQs) at [email or web address]. Sincerely, 
 
Yeni Garcia MD 
Chief Medical Officer Licha Cazares *:  certain medications cannot be prescribed via surespotisabelfin Providers Seen During Your Hospitalization Provider Specialty Primary office phone Millie Castro MD Cardiology [phone] Your Primary Care Physician (PCP) Primary Care Physician Office Phone Office Fax Deepa Brenner [phone] You are allergic to the following No active allergies Recent Documentation Height Weight Breastfeeding? BMI OB Status Smoking Status 1.676 m 77.6 kg No 27.6 kg/m2 Hysterectomy Never Smoker Emergency Contacts Name Discharge Info Relation Home Work Mobile Ambreen Hess DISCHARGE CAREGIVER [3] Child [2] [phone] Patient Belongings The following personal items are in your possession at time of discharge: 
  Dental Appliances: None  Visual Aid: Glasses, With patient      Home Medications: None   Jewelry: Necklace  Clothing: Pants, Shirt, Undergarments, At bedside    Other Valuables: Cell Phone, Anupama Fink Please provide this summary of care documentation to your next provider. Signatures-by signing, you are acknowledging that this After Visit Summary has been reviewed with you and you have received a copy. Patient Signature:  ____________________________________________________________ Date:  ____________________________________________________________  
  
Addy Boss Provider Signature:  ____________________________________________________________ Date:  ____________________________________________________________

## 2018-09-18 NOTE — Clinical Note
Bilateral groin and left radial prepped with ChloraPrep and draped. Wet prep solution applied at: 930. Wet prep solution dried at: 935. Wet prep elapsed drying time: 5 mins.

## 2018-09-18 NOTE — H&P
Date of Surgery Update: 
Magdaleno Sharif was seen and examined. History and physical has been reviewed. The patient has been examined. There have been no significant clinical changes since the completion of the originally dated History and Physical. 
 
Patient assessed and is candidate for moderate sedation.    
 
Signed By: Paxton Polanco MD   
 September 18, 2018 9:41 AM   
  
 
 
 
 insomnia

## 2018-09-18 NOTE — ROUTINE PROCESS
Cardiac Cath Lab:  Pre Procedure Chart Check Patients chart was accessed and reviewed for possible and/or scheduled procedure. Creatinine Clearance: CREATININE: 1.02 MG/DL (09/05/18 1450) Estimated creatinine clearance: 51.6 mL/min Total Contrast  Load: 
3 x estimated clearance amount=  154.8ml 
 
75% of Contrast Load: 0.75 x Total Contrast Load=    116ml No results for input(s): WBC, RBC, HCT, HGB, PLT, INR, APTT, PTP, NA, K, BUN, CREA, GFRAA, GFRNA, CA, MAG, CPK, CKMB, CKNDX, CKND1, TROPT, TROIQ, BNPP, BNP, HCTEXT, HGBEXT, PLTEXT in the last 72 hours. No lab exists for component: DDIMER, GLUCOSE, INREXT 
 
BMI: Body mass index is 27.6 kg/(m^2). ALLERGIES: No Known Allergies Lines: 
  
  
Peripheral IV 09/18/18 Right Antecubital (Active) Site Assessment Clean, dry, & intact 9/18/2018  7:58 AM  
Phlebitis Assessment 0 9/18/2018  7:58 AM  
Infiltration Assessment 0 9/18/2018  7:58 AM  
Dressing Status Clean, dry, & intact 9/18/2018  7:58 AM  
Dressing Type Transparent;Tape 9/18/2018  7:58 AM  
Hub Color/Line Status Blue;Capped; Infusing;Flushed 9/18/2018  7:58 AM  
Action Taken Open ports on tubing capped 9/18/2018  7:58 AM  
Alcohol Cap Used Yes 9/18/2018  7:58 AM  
 
  
 
History: 
 
Past Medical History:  
Diagnosis Date  Abnormal exercise tolerance test 2/2/2011  CAD (coronary artery disease), native coronary artery 3/3/2011  CAD (coronary artery disease), native coronary artery  Chest pain, precordial 2/2/2011  Essential hypertension  Essential hypertension, benign 6/28/2013  Myocardial infarct ([facility] [address]  Other and unspecified hyperlipidemia 1/27/2014  
 SOB (shortness of breath) on exertion 2/2/2011 Past Surgical History:  
Procedure Laterality Date  CARDIAC SURG PROCEDURE UNLIST    
 stents placement  HX CORONARY STENT PLACEMENT  2011  
 4 stents  HX GYN    
 hysterectomy  HX PTCA Patient Active Problem List  
Diagnosis Code  SOB (shortness of breath) on exertion R06.02  
 Chest pain, precordial R07.2  Abnormal exercise tolerance test R94.30  CAD (coronary artery disease), native coronary artery I25.10  Essential hypertension, benign I10  
 Other and unspecified hyperlipidemia E78.5

## 2018-09-23 LAB
CRD SYSTOLIC BP: 126
END DIASTOLIC PRESSURE: 1

## (undated) DEVICE — PRESSURE MONITORING SET: Brand: TRUWAVE

## (undated) DEVICE — BAND COMPR L21CM SHT CLR PLAS HEMSTAT EXT HK AND LOOP RETEN

## (undated) DEVICE — ANGIOGRAPHIC CATHETER: Brand: EXPO™

## (undated) DEVICE — SHIELD RAD 14X16 IN W/ SCOOP ABSORBER

## (undated) DEVICE — PACK PROCEDURE SURG CATH CUST

## (undated) DEVICE — DRAPE,ANGIO,BRACH,STERILE,38X44: Brand: MEDLINE

## (undated) DEVICE — PROCEDURE KIT FLUID MGMT 10 FR CUST MAINFOLD

## (undated) DEVICE — GLIDESHEATH SLENDER STAINLESS STEEL KIT: Brand: GLIDESHEATH SLENDER

## (undated) DEVICE — TUBING PRSS MON L24IN PVC RIG NONEXPANDING M TO FEM CONN

## (undated) DEVICE — STOPCOCK TRNSDUC 500PSI 3 W ROT M LUER LT BLU OFF HNDL R

## (undated) DEVICE — GUIDEWIRE VASC L260CM DIA0.035IN RAD 3MM J TIP L7CM PTFE